# Patient Record
(demographics unavailable — no encounter records)

---

## 2017-09-15 NOTE — RAD
DATE: 9/15/2017



EXAM: DIGITAL DIAGNOSTIC RT



HISTORY: Follow-up.



COMPARISON: Note is made of the screening examination January 27, 2017 and the

diagnostic examination 2/3/2017



This study was interpreted with the benefit of Computerized Aided Detection

(CAD).



FINDINGS:



Breast Density: SCATTERED  The breast parenchyma shows scattered

fibroglandular densities. Breast parenchyma level B. 



 Small nodule in the right breast is seen and appears unchanged compared to

the previous exam. There has not been a significant change in the appearance

of the breast relative to the previous exam follow-up bilateral mammography in

January, 2018







IMPRESSION: Unchanged nodule right breast. Benign findings. Follow-up

bilateral screening examination suggested in 3-6 months







BI-RADS CATEGORY: 2 BENIGN FINDING(S)



RECOMMENDED FOLLOW-UP: 6M 6 MONTH FOLLOW-UP



PQRS compliance statement: Patient information was entered into a reminder

system with a target due date January, 2018 for the next mammogram.



Mammography is a sensitive method for finding small breast cancers, but it

does not detect them all and is not a substitute for careful clinical

examination.  A negative mammogram does not negate a clinically suspicious

finding and should not result in delay in biopsying a clinically suspicious

abnormality.



"Our facility is accredited by the [employer] Mammography

Program."